# Patient Record
Sex: MALE | Race: WHITE
[De-identification: names, ages, dates, MRNs, and addresses within clinical notes are randomized per-mention and may not be internally consistent; named-entity substitution may affect disease eponyms.]

---

## 2017-10-14 ENCOUNTER — HOSPITAL ENCOUNTER (EMERGENCY)
Dept: HOSPITAL 43 - DL.ED | Age: 57
Discharge: NURSING, REHAB OR LONG-TERM CARE | End: 2017-10-14
Payer: COMMERCIAL

## 2017-10-14 DIAGNOSIS — Z79.899: ICD-10-CM

## 2017-10-14 DIAGNOSIS — Z79.82: ICD-10-CM

## 2017-10-14 DIAGNOSIS — I21.4: Primary | ICD-10-CM

## 2017-10-14 LAB
CHLORIDE SERPL-SCNC: 103 MMOL/L (ref 101–111)
SODIUM SERPL-SCNC: 139 MMOL/L (ref 135–145)

## 2017-10-14 PROCEDURE — 85379 FIBRIN DEGRADATION QUANT: CPT

## 2017-10-14 PROCEDURE — 80053 COMPREHEN METABOLIC PANEL: CPT

## 2017-10-14 PROCEDURE — 96365 THER/PROPH/DIAG IV INF INIT: CPT

## 2017-10-14 PROCEDURE — 93005 ELECTROCARDIOGRAM TRACING: CPT

## 2017-10-14 PROCEDURE — 36415 COLL VENOUS BLD VENIPUNCTURE: CPT

## 2017-10-14 PROCEDURE — 84484 ASSAY OF TROPONIN QUANT: CPT

## 2017-10-14 PROCEDURE — 85025 COMPLETE CBC W/AUTO DIFF WBC: CPT

## 2017-10-14 PROCEDURE — 99285 EMERGENCY DEPT VISIT HI MDM: CPT

## 2017-10-14 PROCEDURE — 71010: CPT

## 2017-10-14 NOTE — EDM.PDOC
ED HPI GENERAL MEDICAL PROBLEM





- General


Chief Complaint: Chest Pain


Stated Complaint: CHEST PAINS,FINGERS TINGLY,COMING BY OWN VEHICLE


Time Seen by Provider: 10/14/17 16:07


Source of Information: Reports: Patient


History Limitations: Reports: No Limitations





- History of Present Illness


INITIAL COMMENTS - FREE TEXT/NARRATIVE: 





55 yo male c/o chest pain X many months PMHx. DM and GERD.  Pt. admits to SOB w

/ exertion and some sweating but denies radiation to jaw and no N&V.  Pt. works 

in [employer] and does alto of Physical work


Onset: Unknown/Unsure


Duration: Week(s):


Location: Reports: Chest


Quality: Reports: Ache


Severity: Moderate


Improves with: Reports: Rest


Worsens with: Reports: Movement


Associated Symptoms: Reports: No Other Symptoms





- Related Data


 Allergies











Allergy/AdvReac Type Severity Reaction Status Date / Time


 


No Known Allergies Allergy   Verified 10/14/17 15:59











Home Meds: 


 Home Meds





Aspirin [Ecotrin] 81 mg PO DAILY 12/30/13 [History]


Canagliflozin [Invokana] 300 mg PO DAILY 12/30/13 [History]


Fluticasone/Salmeterol [Advair 250-50 Diskus] 1 puff INH BID 12/30/13 [History]


Insulin Glarg,Human.Rec.Analog [LantUS] 40 unit SUBCUT DAILY 12/30/13 [History]


Omeprazole 20 mg PO DAILY 12/30/13 [History]


metFORMIN [metFORMIN XR] 500 mg PO BID 12/30/13 [History]


Lutein Extract/Zeaxanthin Ext [Lutein 15 MG Softgel] 1 tab PO ASDIRECTED 10/14/

17 [History]


Valsartan/Hydrochlorothiazide [Valsartan-Hctz 160-25 mg Tab] 1 tab PO 

ASDIRECTED 10/14/17 [History]











ED ROS GENERAL





- Review of Systems


Review Of Systems: See Below


Constitutional: Reports: No Symptoms


HEENT: Reports: No Symptoms


Respiratory: Reports: No Symptoms


Cardiovascular: Reports: Chest Pain


Endocrine: Reports: No Symptoms


GI/Abdominal: Reports: No Symptoms


: Reports: No Symptoms


Musculoskeletal: Reports: Other (anterior chest pain)


Skin: Reports: No Symptoms


Neurological: Reports: No Symptoms


Psychiatric: Reports: No Symptoms


Hematologic/Lymphatic: Reports: No Symptoms


Immunologic: Reports: No Symptoms





ED EXAM, GENERAL





- Physical Exam


Exam: See Below


Exam Limited By: No Limitations


General Appearance: Alert, No Apparent Distress


Eye Exam: Bilateral Eye: EOMI, PERRL


Ears: Normal External Exam, Normal Canal


Nose: Normal Inspection


Throat/Mouth: Normal Inspection, Normal Lips


Head: Atraumatic


Neck: Normal Inspection


Respiratory/Chest: No Respiratory Distress, Lungs Clear, Other (tenderness to 

costocartilege)


Cardiovascular: Normal Peripheral Pulses, Regular Rate, Rhythm


Peripheral Pulses: 2+: Radial (L), Radial (R), Femoral (L), Femoral (R)


GI/Abdominal: Normal Bowel Sounds, Soft


Back Exam: Normal Inspection, Full Range of Motion


Extremities: Normal Inspection, Normal Range of Motion


Neurological: Alert, Oriented, CN II-XII Intact, Normal Cognition


Psychiatric: Normal Affect


Skin Exam: Warm, Dry, Intact


Lymphatic: No Adenopathy





Course





- Vital Signs


Last Recorded V/S: 


 Last Vital Signs











Temp  37.2 C   10/14/17 16:03


 


Pulse  78   10/14/17 16:03


 


Resp  20   10/14/17 16:03


 


BP  153/68 H  10/14/17 16:03


 


Pulse Ox  98   10/14/17 16:03














- Orders/Labs/Meds


Orders: 


 Active Orders 24 hr











 Category Date Time Status


 


 EKG Documentation Completion [RC] STAT Care  10/14/17 16:08 Active


 


 Chest 1V Frontal [CR] Urgent Exams  10/14/17 16:20 Taken


 


 Heparin Sodium/D5W [Heparin 25,000 Units in D5W 500 ML] Med  10/14/17 17:00 

Active





 25,000 units in 500 ml IV TITRATE   


 


 Sodium Chloride 0.9% [Normal Saline] 250 ml Med  10/14/17 16:15 Active





 IV ASDIRECTED   








 Medication Orders





Sodium Chloride (Normal Saline)  250 mls @ 50 mls/hr IV ASDIRECTED TAD


   Last Admin: 10/14/17 16:47  Dose: 50 mls/hr


Heparin Sodium/Dextrose (Heparin 25,000 Units In D5w 500 Ml)  25,000 units in 

500 mls @ 0 mls/hr IV TITRATE TAD; 1,000 UNITS/KG/HR


   PRN Reason: Protocol








Labs: 


 Laboratory Tests











  10/14/17 10/14/17 10/14/17 Range/Units





  15:53 15:53 15:53 


 


WBC  10.1 H    (5.0-10.0)  10^3/uL


 


RBC  4.99    (4.6-6.2)  10^6/uL


 


Hgb  14.0    (14.0-18.0)  g/dL


 


Hct  41.1    (40.0-54.0)  %


 


MCV  82.4    ()  fL


 


MCH  28.1    (27.0-34.0)  pg


 


MCHC  34.1    (33.0-35.0)  g/dL


 


Plt Count  233    (150-450)  10^3/uL


 


Neut % (Auto)  72.4    (42.2-75.2)  %


 


Lymph % (Auto)  18.3 L    (20.5-50.1)  %


 


Mono % (Auto)  6.7    (2-8)  %


 


Eos % (Auto)  2.2    (1.0-3.0)  %


 


Baso % (Auto)  0.4    (0.0-1.0)  %


 


D-Dimer, Quantitative   < 100   (0-400)  ng/mL


 


Sodium    139  (135-145)  mmol/L


 


Potassium    3.5 L  (3.6-5.0)  mmol/L


 


Chloride    103  (101-111)  mmol/L


 


Carbon Dioxide    26.0  (21.0-31.0)  mmol/L


 


Anion Gap    13.5  


 


BUN    21 H  (7-18)  mg/dL


 


Creatinine    0.9  (0.6-1.3)  mg/dL


 


Est Cr Clr Drug Dosing    85.69  mL/min


 


Estimated GFR (MDRD)    > 60  


 


BUN/Creatinine Ratio    23.33  


 


Glucose    168 H  ()  mg/dL


 


Calcium    9.6  (8.4-10.2)  mg/dl


 


Total Bilirubin    0.7  (0.2-1.0)  mg/dL


 


AST    22  (10-42)  IU/L


 


ALT    21  (10-60)  IU/L


 


Alkaline Phosphatase    64  ()  IU/L


 


Troponin I    0.14 H*  (0.00-0.02)  ng/ml


 


Total Protein    7.0  (6.7-8.2)  g/dl


 


Albumin    4.1  (3.2-5.5)  g/dl


 


Globulin    2.9  


 


Albumin/Globulin Ratio    1.41  











Meds: 


Medications











Generic Name Dose Route Start Last Admin





  Trade Name Freq  PRN Reason Stop Dose Admin


 


Sodium Chloride  250 mls @ 50 mls/hr  10/14/17 16:15  10/14/17 16:47





  Normal Saline  IV   50 mls/hr





  ASDIRECTED TAD   Administration


 


Heparin Sodium/Dextrose  25,000 units in 500 mls @ 0 mls/hr  10/14/17 17:00  





  Heparin 25,000 Units In D5w 500 Ml  IV   





  TITRATE [facility]   





  Protocol   





  1,000 UNITS/KG/HR   














Discontinued Medications














Generic Name Dose Route Start Last Admin





  Trade Name Gavin  PRN Reason Stop Dose Admin


 


Aspirin  324 mg  10/14/17 16:08  10/14/17 16:45





  Aspirin  PO  10/14/17 16:09  324 mg





  ONETIME ONE   Administration


 


Heparin Sodium (Porcine)  Confirm  10/14/17 16:54  





  Heparin Sodium  Administered  10/14/17 16:55  





  Dose   





  5,000 units   





  .ROUTE   





  .STK-MED ONE   


 


Heparin Sodium (Porcine)  5,000 units  10/14/17 17:00  





  Heparin Sodium  IVPUSH  10/14/17 17:01  





  .BOLUS ONE   


 


Heparin Sodium/Dextrose  Confirm  10/14/17 16:54  





  Heparin 25,000 Units In D5w 500 Ml  Administered  10/14/17 16:55  





  Dose   





  500 mls @ as directed   





  .ROUTE   





  .STK-MED ONE   














Departure





- Departure


Time of Disposition: 17:06


Disposition: DC[facility] to [facility]


Reason for Transfer *Q: Primary PCI Indicated


Condition: Fair


Clinical Impression: 


Acute myocardial infarction


Qualifiers:


 Myocardial infarction ST status: non-ST elevation myocardial infarction 

Qualified Code(s): I21.4 - Non-ST elevation (NSTEMI) myocardial infarction





Forms:  ED Department Discharge, Interfacility Transfer EMTALA





- My Orders


Last 24 Hours: 


My Active Orders





10/14/17 16:08


EKG Documentation Completion [RC] STAT 





10/14/17 16:15


Sodium Chloride 0.9% [Normal Saline] 250 ml IV ASDIRECTED 





10/14/17 16:20


Chest 1V Frontal [CR] Urgent 





10/14/17 17:00


Heparin Sodium/D5W [Heparin 25,000 Units in D5W 500 ML] 25,000 units in 500 ml 

IV TITRATE 














- Assessment/Plan


Last 24 Hours: 


My Active Orders





10/14/17 16:08


EKG Documentation Completion [RC] STAT 





10/14/17 16:15


Sodium Chloride 0.9% [Normal Saline] 250 ml IV ASDIRECTED 





10/14/17 16:20


Chest 1V Frontal [CR] Urgent 





10/14/17 17:00


Heparin Sodium/D5W [Heparin 25,000 Units in D5W 500 ML] 25,000 units in 500 ml 

IV TITRATE

## 2017-11-22 ENCOUNTER — HOSPITAL ENCOUNTER (OUTPATIENT)
Dept: HOSPITAL 43 - DL.SDS | Age: 57
Discharge: HOME | End: 2017-11-22
Attending: OPHTHALMOLOGY
Payer: COMMERCIAL

## 2017-11-22 DIAGNOSIS — I25.10: ICD-10-CM

## 2017-11-22 DIAGNOSIS — K21.9: ICD-10-CM

## 2017-11-22 DIAGNOSIS — J45.909: ICD-10-CM

## 2017-11-22 DIAGNOSIS — Z91.040: ICD-10-CM

## 2017-11-22 DIAGNOSIS — E78.00: ICD-10-CM

## 2017-11-22 DIAGNOSIS — H26.9: ICD-10-CM

## 2017-11-22 DIAGNOSIS — I10: ICD-10-CM

## 2017-11-22 DIAGNOSIS — Z79.01: ICD-10-CM

## 2017-11-22 DIAGNOSIS — E11.9: ICD-10-CM

## 2017-11-22 DIAGNOSIS — Z53.8: Primary | ICD-10-CM

## 2017-11-22 DIAGNOSIS — G47.30: ICD-10-CM

## 2017-11-22 DIAGNOSIS — Z90.49: ICD-10-CM

## 2017-11-22 PROCEDURE — 82962 GLUCOSE BLOOD TEST: CPT

## 2017-11-27 NOTE — EKG
10/14/2017- DREW, ANAND POND -

 

This 12-lead EKG showing normal sinus rhythm with a ventricular rate of 73 beats

per minute.  Normal ID interval, QRS duration.  No QT prolongation.  Normal

axis.  No acute ST-T changes.

 

DD:  11/25/2017 14:09:16

DT:  11/25/2017 14:47:33

[facility]

Job #:  [id number]

## 2019-03-13 ENCOUNTER — HOSPITAL ENCOUNTER (OUTPATIENT)
Dept: HOSPITAL 43 - DL.ENDO | Age: 59
Discharge: HOME | End: 2019-03-13
Attending: SURGERY
Payer: COMMERCIAL

## 2019-03-13 DIAGNOSIS — Z79.4: ICD-10-CM

## 2019-03-13 DIAGNOSIS — Z79.899: ICD-10-CM

## 2019-03-13 DIAGNOSIS — R10.9: Primary | ICD-10-CM

## 2019-03-13 DIAGNOSIS — Z79.82: ICD-10-CM

## 2019-03-13 NOTE — OR
DATE:  03/13/2019

 

PREOPERATIVE DIAGNOSIS:  Crampy abdominal pain.

 

POSTOPERATIVE DIAGNOSIS:  Crampy abdominal pain.

 

PROCEDURE:  Total colonoscopy.

 

ANESTHESIA:  Conscious sedation with IV Versed and fentanyl.

 

SPECIMEN:  None.

 

FINDINGS:  Normal colonoscopy.

 

RECOMMENDATION:  Followup colonoscopy for polyp screening in 10 years.

 

INDICATION FOR PROCEDURE:  This 58-year-old male was referred by MARLON Blanco,

for evaluation of history of crampy abdominal pain.  He has not had a prior

colonoscopy.

 

DESCRIPTION OF PROCEDURE:  After adequate preparation, a colonoscope was

inserted into the rectum.  This was easily passed all the way to the cecum.

Confirmation of the cecum was made by visualization of the ileocecal valve and

palpation in the right lower quadrant.  The bowel prep was excellent.  On

withdrawal of the scope, no abnormalities were

noted.  The anal and rectal examination were also normal.  Air was suctioned

from the colon, and the scope removed.

 

DD:  03/13/2019 09:28:46

DT:  03/13/2019 09:52:22

[facility]

Job #:  [id number]

## 2020-01-17 NOTE — OR
DATE:  01/17/2020

 

PREOPERATIVE DIAGNOSIS:  Gastroesophageal reflux symptoms, belching, abdominal

bloating.

 

POSTOPERATIVE DIAGNOSIS:  Gastroesophageal reflux symptoms, belching, abdominal

bloating.

 

PROCEDURE:  EGD with biopsy for PyloriTek H. pylori evaluation.

 

ANESTHESIA:  Conscious sedation with IV Versed.

 

SPECIMEN:  Pre-pyloric antral biopsy for H. pylori.

 

OPERATIVE FINDINGS:  A small 1 to 2 cm hiatal hernia with eversion of the

gastric mucosa.  This should not be significant.  He also has some hyperplastic

polyps within the body of the stomach indicative of proton pump inhibitor

treatment.  No other abnormalities were noted.

 

INDICATION FOR PROCEDURE:  This 59-year-old male [facility]

for evaluation of increased belching and bloating.  He also complains of a nasty

bad taste and smell with the belching.

 

PROCEDURE IN DETAIL:  After adequate preparation, a gastroscope was inserted

into the esophagus.  This was passed down to the distal esophagus.  This shows

no evidence of distal esophagitis, but he does have about a 1 to 2 cm hiatal

hernia with eversion of the gastric mucosa into the EG junction.  The scope was

advanced into the stomach.  Both forward and retroflexed views were done.  The

only abnormality is some hyperplastic polyp formation in the body of the

stomach, shows no evidence of gastritis.  The pylorus is open.  The scope was

advanced into the duodenum, which is normal.  On retrieval of the scope into the

stomach, I did a biopsy to evaluate for H. pylori.  Otherwise,

examination is normal.  Air was suctioned from the stomach and the scope

removed.

 

DD:  01/17/2020 10:37:45

DT:  01/17/2020 12:19:15

[facility]

Job #:  [id number]